# Patient Record
Sex: FEMALE | Race: WHITE
[De-identification: names, ages, dates, MRNs, and addresses within clinical notes are randomized per-mention and may not be internally consistent; named-entity substitution may affect disease eponyms.]

---

## 2017-05-29 ENCOUNTER — HOSPITAL ENCOUNTER (EMERGENCY)
Dept: HOSPITAL 39 - ER | Age: 42
Discharge: ANOTHER HOSPITAL | End: 2017-05-29
Payer: COMMERCIAL

## 2017-05-29 VITALS — OXYGEN SATURATION: 97 %

## 2017-05-29 VITALS — SYSTOLIC BLOOD PRESSURE: 97 MMHG | TEMPERATURE: 97.7 F | DIASTOLIC BLOOD PRESSURE: 68 MMHG

## 2017-05-29 DIAGNOSIS — R07.89: Primary | ICD-10-CM

## 2017-05-29 DIAGNOSIS — I10: ICD-10-CM

## 2017-05-29 DIAGNOSIS — Z79.899: ICD-10-CM

## 2017-05-29 DIAGNOSIS — G43.909: ICD-10-CM

## 2017-05-29 DIAGNOSIS — I95.0: ICD-10-CM

## 2017-05-29 PROCEDURE — 36415 COLL VENOUS BLD VENIPUNCTURE: CPT

## 2017-05-29 PROCEDURE — 70450 CT HEAD/BRAIN W/O DYE: CPT

## 2017-05-29 PROCEDURE — 94760 N-INVAS EAR/PLS OXIMETRY 1: CPT

## 2017-05-29 PROCEDURE — 80048 BASIC METABOLIC PNL TOTAL CA: CPT

## 2017-05-29 PROCEDURE — 85025 COMPLETE CBC W/AUTO DIFF WBC: CPT

## 2017-05-29 PROCEDURE — 85379 FIBRIN DEGRADATION QUANT: CPT

## 2017-05-29 PROCEDURE — 85610 PROTHROMBIN TIME: CPT

## 2017-05-29 PROCEDURE — 71010: CPT

## 2017-05-29 PROCEDURE — 85730 THROMBOPLASTIN TIME PARTIAL: CPT

## 2017-05-29 PROCEDURE — 83605 ASSAY OF LACTIC ACID: CPT

## 2017-05-29 PROCEDURE — 81001 URINALYSIS AUTO W/SCOPE: CPT

## 2017-05-29 PROCEDURE — 83880 ASSAY OF NATRIURETIC PEPTIDE: CPT

## 2017-05-29 PROCEDURE — 82550 ASSAY OF CK (CPK): CPT

## 2017-05-29 PROCEDURE — 93005 ELECTROCARDIOGRAM TRACING: CPT

## 2017-05-29 PROCEDURE — 82553 CREATINE MB FRACTION: CPT

## 2017-05-29 PROCEDURE — 84484 ASSAY OF TROPONIN QUANT: CPT

## 2017-05-29 PROCEDURE — 71275 CT ANGIOGRAPHY CHEST: CPT

## 2017-05-29 PROCEDURE — 84443 ASSAY THYROID STIM HORMONE: CPT

## 2017-05-29 PROCEDURE — 80076 HEPATIC FUNCTION PANEL: CPT

## 2017-05-29 PROCEDURE — 80307 DRUG TEST PRSMV CHEM ANLYZR: CPT

## 2017-05-29 RX ADMIN — Medication PRN ML: at 19:04

## 2017-05-29 RX ADMIN — Medication PRN ML: at 20:12

## 2017-05-29 NOTE — CT
PROCEDURE:  Head



CLINICAL HISTORY:  41 years  Female  headache



COMPARISON:  12/18/2012



TECHNIQUE:  Contiguous axial images obtained through the brain

without IV contrast.  



This exam was performed according to our department optimization

program which includes automated exposure control, adjustment of

the mA and/or kv according to patient size and/or use of

iterative reconstruction technique.



FINDINGS:



The ventricles and sulci are within normal limits for the

patient's age.

No midline shift or mass effect.

No masses identified.

No acute intracranial hemorrhage. 



No fluid or significant mucosal thickening in the visualized

paranasal sinuses.

No depressed calvarial fractures.



IMPRESSION:

  

No acute intracranial abnormality is identified.



Electronically signed by:  Alia Mcnamara  5/29/2017 8:13 PM CDT

Workstation: [id number]

## 2017-05-29 NOTE — CT
PROCEDURE:  CTA Chest



CLINICAL HISTORY:  41 years  Female  pain



COMPARISON:  None.



TECHNIQUE: Contiguous axial images obtained through the chest

during the infusion of IV contrast. Reformatted images obtained. 

MIP reformatted images obtained.   



This exam was performed according to our department optimization

program which includes automated exposure control, adjustment of

the mA and/or kv according to patient size and/or use of

iterative reconstruction technique.



FINDINGS:



The aorta is normal in caliber without dissection or rupture.

No evidence of pulmonary embolus.

No mediastinal or hilar adenopathy.

No acute infiltrate. No pericardial or pleural effusion. Upper

abdominal structures appear unremarkable.



IMPRESSION:No evidence of pulmonary embolus







Electronically signed by:  Alia Mcnamara  5/29/2017 9:49 PM CDT

Workstation: [id number]

## 2017-05-29 NOTE — RAD
EXAM DESCRIPTION:  Chest,1 View



CLINICAL HISTORY:  41 years  Female  pain



COMPARISON:  None.



FINDINGS: 



The cardiomediastinal silhouette appears unremarkable.

No consolidating infiltrates or pleural effusions.

No pneumothorax.



IMPRESSION: 



No acute abnormality is identified.



Electronically signed by:  Alia Mcnamara  5/29/2017 6:32 PM CDT

Workstation: [id number]

## 2017-05-29 NOTE — ED.PDOC
History of Present Illness





- General


Chief Complaint: Chest Pain/MI


Stated Complaint: chest pain


Time Seen by Provider: 17 18:07


Source: patient


Exam Limitations: no limitations





- History of Present Illness


Initial Comments: 





Darcy Hinojosa 42 y/o female stated that while she was at home sitting on her 

chair she had onset of chest heaviness got sweaty felt dizzy,nauseated and also 

had sharp headache her symptoms been steady and decide to come to er.Has 

history of htn,removal of brain tumor s/p craniotomy. 


Timing/Duration: 4-6 hours


Severity: moderate


Location: central, other - radiation to her back


Activities at Onset: rest


Prior Chest Pain/Cardiac Workup: no prior chest pain, no prior cardiac workup


Improving Factors: nothing


Worsening Factors: nothing


Nitro Today/Relief: provided by ED


Aspirin Treatment Today: 325 mg x 1, provided by ED


Associated Symptoms: diaphoresis, headaches, nausea/vomiting


Allergies/Adverse Reactions: 


Allergies





NO KNOWN ALLERGY Allergy (Verified 17 17:59)


 








Home Medications: 


Ambulatory Orders





Fesoterodine Fumarate [Toviaz] 8 mg PO DAILY #0  10/01/13 


Cephalexin Monohydrate [Keflex Cap] 500 mg PO Q6HRS #30 cap 03/08/15 


Eletriptan Hydrobromide [Relpax] 40 mg PO DAILY PRN 03/08/15 


Gabapentin 300 mg PO BID 07/04/15 


HYDROcodone 10MG/APAP 325MG 10 bottle PO PRN 07/04/15 


traZODone HCL [Desyrel] 150 mg PO DAILY 07/04/15 











Review of Systems





- Review of Systems


Constitutional: States: no symptoms reported


EENTM: States: no symptoms reported


Cardiology: States: see HPI


Gastrointestinal/Abdominal: States: no symptoms reported


Genitourinary: States: other - oab chronic


Musculoskeletal: States: no symptoms reported


Skin: States: no symptoms reported


Neurological: States: seizure - disorder


Endocrine: States: no symptoms reported


Hematologic/Lymphatic: States: no symptoms reported





Past Medical History (General)





- Patient Medical History


Hx Seizures: Yes


Hx Stroke: No


Hx Dementia: No


Hx Asthma: No


Hx of COPD: No


Hx Cardiac Disorders: No


Hx Congestive Heart Failure: No


Hx Pacemaker: No


Hx Hypertension: Yes


Hx Thyroid Disease: No


Hx Diabetes: No


Hx Gastroesophageal Reflux: No


Hx Renal Disease: No


Hx Cancer: No


Hx of HIV: No


Hx Hepatitis C: No


Hx MRSA: No


Hx Other PMH: Yes - migraine headaches


Surgical History: Hysterectomy, other - craniotomy ,hysterectomy, 

carpal tunnel,c-spine





- Vaccination History


Hx Tetanus, Diphtheria Vaccination: No


Hx Influenza Vaccination: Yes


Hx Pneumococcal Vaccination: Yes





- Social History


Hx Tobacco Use: No


Hx Chewing Tobacco Use: No


Hx Alcohol Use: No


Hx Substance Use: No


Hx Substance Use Treatment: No


Hx Depression: No


Hx Physical Abuse: No


Hx Emotional Abuse: No


Hx Suspected Abuse: No





- Activities of Daily Living


Patient Lives Alone: No - family


Hospice Agency (if applicable):: None


Grooming Ability: Independent


Eating (Feeding) Ability: Independent


Toileting Ability: Independent





- Female History


Patient is a Female of Child Bearing Age (10 -59 yrs old): No


Patient Pregnant: No





Family Medical History





- Family History


  ** Mother


Family History: Unknown


Living Status: Unknown


Hx Family Hypertension: Yes - parents


Hx Family Stroke: Yes - brain aneurysm-dad,sister  from it


Hx Cardiac Disease: Yes - parents





Physical Exam





- Physical Exam


General Appearance: Alert, Anxious, No apparent distress


Eyes, Ears, Nose, Throat Exam: PERRL/EOMI, normal ENT inspection, TMs normal


Neck: non-tender, full range of motion, supple, normal inspection


Respiratory: chest non-tender, lungs clear, normal breath sounds, no 

respiratory distress


Cardiovascular/Chest: normal peripheral pulses, regular rate, rhythm, no edema, 

no murmur


Peripheral Pulses: radial,right: 2+, radial,left: 2+


Gastrointestinal/Abdominal: normal bowel sounds, non tender, soft, no 

organomegaly


Extremity: normal range of motion, non-tender, normal inspection


Neurologic: no motor/sensory deficits, alert, normal mood/affect, oriented x 3


Skin Exam: normal color, warm/dry


Lymphatic: no adenopathy





Progress





- Results/Orders


Results/Orders: 





 Vital Signs - 8 hr











  17





  17:45 18:00 18:48


 


Temperature 98.4 F  


 


Pulse Rate [ 121 H 115 H 





pulse ox]   


 


Respiratory 20  





Rate   


 


Blood Pressure 154/81  





[Right Arm]   


 


O2 Sat by Pulse 96  96





Oximetry   








 





17 18:07


IV Care:Saline Lock per Protoc QSHIFT 


Telemetry .ONCE 


Sodium Chloride 0.9% (Flush) [Saline Flush Syringe]   10 ml IV PRN PRN 


EKG Stat 


Pulse Ox Stat 





17 18:20


THYROID STIMULATING HORMONE Stat 








 Laboratory Results











WBC  14.5 K/mm3 (4.8-10.8)  H  17  18:20    


 


RBC  4.45 M/mm3 (4.20-5.40)   17  18:20    


 


Hgb  12.9 gm/dL (12.0-16.0)   17  18:20    


 


Hct  39.1 % (36.0-47.0)   17  18:20    


 


MCV  88.0 fl (81.0-99.0)   17  18:20    


 


MCH  29.1 pg (27.0-31.0)   17  18:20    


 


MCHC  33.1 g/dL (33.0-37.0)   17  18:20    


 


RDW  13.4 % (11.5-14.5)   17  18:20    


 


Plt Count  240 K/mm3 (130-400)   17  18:20    


 


MPV  7.7 fl (7.40-10.4)   17  18:20    


 


Absolute Neuts (auto)  11.70 K/uL (1.8-6.8)  H  17  18:20    


 


Absolute Lymphs (auto)  2.10 K/uL (1.0-3.4)   17  18:20    


 


Absolute Monos (auto)  0.60 K/uL (0.2-0.8)   17  18:20    


 


Absolute Eos (auto)  0.10 K/uL (0.0-0.4)   17  18:20    


 


Absolute Basos (auto)  0.10 K/uL (0.0-0.1)   17  18:20    


 


Neutrophils %  80.4 % (42.0-78.0)  H  17  18:20    


 


Lymphocytes %  14.4 % (20.0-50.0)  L  17  18:20    


 


Monocytes %  4.1 % (2.0-9.0)   17  18:20    


 


Eosinophils %  0.3 % (1.0-5.0)  L  17  18:20    


 


Basophils %  0.8 % (0.0-2.0)   17  18:20    


 


PT  10.3 SECONDS (9.4-12.5)   17  18:20    


 


INR  0.910   17  18:20    


 


PTT (SP)  32.6 SECONDS (25.1-36.5)   17  18:20    


 


D-Dimer, Quantitative  < 230 ng/mL (0-230)   17  18:20    


 


Sodium  138 mmol/L (135-145)   17  18:20    


 


Potassium  3.1 mmol/L (3.6-5.0)  L  17  18:20    


 


Chloride  107 mmol/L (101-111)   17  18:20    


 


Carbon Dioxide  24 mmol/L (21-31)   17  18:20    


 


Anion Gap  10.1  (12-18)  L  17  18:20    


 


BUN  17 mg/dL (7-18)   17  18:20    


 


Creatinine  0.95 mg/dL (0.6-1.3)   17  18:20    


 


BUN/Creatinine Ratio  17.9  (10-20)   17  18:20    


 


Random Glucose  126 mg/dL ()  H  17  18:20    


 


Serum Osmolality  278.8 mOsm/L (275-295)   17  18:20    


 


Calcium  9.9 mg/dL (8.4-10.2)   17  18:20    


 


Magnesium  1.8 mg/dL (1.8-2.5)   17  18:20    


 


Total Bilirubin  0.2 mg/dL (0.2-1.0)   17  18:20    


 


Direct Bilirubin  < 0.1 mg/dL (0-0.2)   17  18:20    


 


Indirect Bilirubin  0.1 mg/dL (0.2-0.8)  L  17  18:20    


 


AST  19 IU/L (10-42)   17  18:20    


 


ALT  23 IU/L (10-60)   17  18:20    


 


Alkaline Phosphatase  79 IU/L ()   17  18:20    


 


Creatine Kinase  43 IU/L ()   17  18:20    


 


CK-MB (CK-2)  1.2 ng/mL (0.0-4.4)   17  18:20    


 


CK-MB (CK-2) %  Not Reportable   17  18:20    


 


Troponin I  < 0.02 ng/mL (0.01-0.05)   17  18:20    


 


B-Natriuretic Peptide  10.0 pg/ml (0-100)   17  18:20    


 


Serum Total Protein  7.0 gm/dL (6.4-8.2)   17  18:20    


 


Albumin  3.8 g/dl (3.2-5.5)   17  18:20    


 


Urine Color  Yellow  (Yellow)   17  18:35    


 


Urine Appearance  Clear  (Clear)   17  18:35    


 


Urine pH  7.0  (4.5-7.8)   17  18:35    


 


Ur Specific Gravity  1.020  (1.005-1.030)   17  18:35    


 


Urine Protein  Negative mg/dL  17  18:35    


 


Urine Glucose (UA)  Negative mg/dL (Negative)   17  18:35    


 


Urine Ketones  Negative mg/dL (NEGATIVE)   17  18:35    


 


Urine Blood  Negative  (Negative)   17  18:35    


 


Urine Nitrite  Negative   17  18:35    


 


Urine Bilirubin  Negative  (NEGATIVE)   17  18:35    


 


Urine Urobilinogen  0.2 mg/dL (0.2-1.0)   17  18:35    


 


Ur Leukocyte Esterase  Negative  (Negative)   17  18:35    


 


Urine RBC  0 /hpf  17  18:35    


 


Urine WBC  0 /hpf  17  18:35    


 


Ur Epithelial Cells  0 /hpf  17  18:35    


 


Urine Bacteria  0   17  18:35    


 


Urine Opiates Screen  Negative ng/mL (2000)   17  18:35    


 


Urine Barbiturates  Negative ng/mL (200)   17  18:35    


 


Ur Phencyclidine Scrn  Negative ng/mL (25)   17  18:35    


 


U Amphetamin/Meth Scrn  Negative ng/mL (1000)   17  18:35    


 


U Benzodiazepines Scrn  Negative ng/mL (200)   17  18:35    


 


U Cocaine Metab Screen  Negative ng/mL (300)   17  18:35    


 


U Cannabinoids Screen  Negative ng/mL (50)   17  18:35    








 Vital Signs - 8 hr











  17





  17:45 18:00 18:48


 


Temperature 98.4 F  


 


Pulse Rate   


 


Pulse Rate [ 121 H 115 H 





pulse ox]   


 


Respiratory 20  





Rate   


 


Blood Pressure 154/81  





[Right Arm]   


 


O2 Sat by Pulse 96  96





Oximetry   














  17





  19:16 20:13 20:30


 


Temperature 98.8 F  


 


Pulse Rate 104 H 94 H 


 


Pulse Rate [ 104 H 94 H 





pulse ox]   


 


Respiratory 16 16 





Rate   


 


Blood Pressure 109/52 94/62 110/64





[Right Arm]   


 


O2 Sat by Pulse 98 98 





Oximetry   








 Vital Signs - 8 hr











  17





  17:45 18:00 18:48


 


Temperature 98.4 F  


 


Pulse Rate   


 


Pulse Rate [ 121 H 115 H 





pulse ox]   


 


Respiratory 20  





Rate   


 


Blood Pressure 154/81  





[Right Arm]   


 


O2 Sat by Pulse 96  96





Oximetry   














  17





  19:16 20:13 20:30


 


Temperature 98.8 F  


 


Pulse Rate 104 H 94 H 


 


Pulse Rate [ 104 H 94 H 





pulse ox]   


 


Respiratory 16 16 





Rate   


 


Blood Pressure 109/52 94/62 110/64





[Right Arm]   


 


O2 Sat by Pulse 98 98 





Oximetry   














  17





  21:06 21:36 22:14


 


Temperature   


 


Pulse Rate 83  


 


Pulse Rate [ 83 86 87





pulse ox]   


 


Respiratory 18 16 12





Rate   


 


Blood Pressure 109/64 122/75 72/47





[Right Arm]   


 


O2 Sat by Pulse 99  97





Oximetry   














  17





  22:32


 


Temperature 


 


Pulse Rate 


 


Pulse Rate [ 83





pulse ox] 


 


Respiratory 11 L





Rate 


 


Blood Pressure 108/51





[Right Arm] 


 


O2 Sat by Pulse 





Oximetry 








 





17 18:07


IV Care:Saline Lock per Protoc QSHIFT 


Telemetry .ONCE 


Sodium Chloride 0.9% (Flush) [Saline Flush Syringe]   10 ml IV PRN PRN 


EKG Stat 


Pulse Ox Stat 





17 20:00


EKG STAT 





17 20:59


Hold Metformin x 48Hrs [id number] 





17 22:13


Sodium Chloride 0.9% 1000ML [Ns 1000 ml] 1,000 ml IVS ONCE 








 Laboratory Results











WBC  14.1 K/mm3 (4.8-10.8)  H  17  21:08    


 


RBC  4.06 M/mm3 (4.20-5.40)  L  17  21:08    


 


Hgb  11.8 gm/dL (12.0-16.0)  L  17  21:08    


 


Hct  36.1 % (36.0-47.0)   17  21:08    


 


MCV  88.9 fl (81.0-99.0)   17  21:08    


 


MCH  29.0 pg (27.0-31.0)   17  21:08    


 


MCHC  32.7 g/dL (33.0-37.0)  L  17  21:08    


 


RDW  13.6 % (11.5-14.5)   17  21:08    


 


Plt Count  225 K/mm3 (130-400)   17  21:08    


 


MPV  7.6 fl (7.40-10.4)   17  21:08    


 


Absolute Neuts (auto)  11.00 K/uL (1.8-6.8)  H  17  21:08    


 


Absolute Lymphs (auto)  2.30 K/uL (1.0-3.4)   17  21:08    


 


Absolute Monos (auto)  0.70 K/uL (0.2-0.8)   17  21:08    


 


Absolute Eos (auto)  0.10 K/uL (0.0-0.4)   17  21:08    


 


Absolute Basos (auto)  0.10 K/uL (0.0-0.1)   17  21:08    


 


Neutrophils %  78.0 % (42.0-78.0)   17  21:08    


 


Lymphocytes %  16.0 % (20.0-50.0)  L  17  21:08    


 


Monocytes %  5.0 % (2.0-9.0)   17  21:08    


 


Eosinophils %  0.4 % (1.0-5.0)  L  17  21:08    


 


Basophils %  0.6 % (0.0-2.0)   17  21:08    


 


PT  10.3 SECONDS (9.4-12.5)   17  18:20    


 


INR  0.910   17  18:20    


 


PTT (SP)  32.6 SECONDS (25.1-36.5)   17  18:20    


 


D-Dimer, Quantitative  < 230 ng/mL (0-230)   17  18:20    


 


Sodium  138 mmol/L (135-145)   17  18:20    


 


Potassium  3.1 mmol/L (3.6-5.0)  L  17  18:20    


 


Chloride  107 mmol/L (101-111)   17  18:20    


 


Carbon Dioxide  24 mmol/L (21-31)   17  18:20    


 


Anion Gap  10.1  (12-18)  L  17  18:20    


 


BUN  17 mg/dL (7-18)   17  18:20    


 


Creatinine  0.95 mg/dL (0.6-1.3)   17  18:20    


 


BUN/Creatinine Ratio  17.9  (10-20)   17  18:20    


 


Random Glucose  126 mg/dL ()  H  17  18:20    


 


Serum Osmolality  278.8 mOsm/L (275-295)   17  18:20    


 


Lactic Acid  0.4 mmol/L (0.5-2.2)  L  17  20:14    


 


Calcium  9.9 mg/dL (8.4-10.2)   17  18:20    


 


Magnesium  1.8 mg/dL (1.8-2.5)   17  18:20    


 


Total Bilirubin  0.2 mg/dL (0.2-1.0)   17  18:20    


 


Direct Bilirubin  < 0.1 mg/dL (0-0.2)   17  18:20    


 


Indirect Bilirubin  0.1 mg/dL (0.2-0.8)  L  17  18:20    


 


AST  19 IU/L (10-42)   17  18:20    


 


ALT  23 IU/L (10-60)   17  18:20    


 


Alkaline Phosphatase  79 IU/L ()   17  18:20    


 


Creatine Kinase  43 IU/L ()   17  18:20    


 


CK-MB (CK-2)  1.2 ng/mL (0.0-4.4)   17  18:20    


 


CK-MB (CK-2) %  Not Reportable   17  18:20    


 


Troponin I  < 0.02 ng/mL (0.01-0.05)   17  20:14    


 


B-Natriuretic Peptide  10.0 pg/ml (0-100)   17  18:20    


 


Serum Total Protein  7.0 gm/dL (6.4-8.2)   17  18:20    


 


Albumin  3.8 g/dl (3.2-5.5)   17  18:20    


 


TSH  1.74 uIU/mL (0.34-5.60)   17  18:20    


 


Urine Color  Yellow  (Yellow)   17  18:35    


 


Urine Appearance  Clear  (Clear)   17  18:35    


 


Urine pH  7.0  (4.5-7.8)   17  18:35    


 


Ur Specific Gravity  1.020  (1.005-1.030)   17  18:35    


 


Urine Protein  Negative mg/dL  17  18:35    


 


Urine Glucose (UA)  Negative mg/dL (Negative)   17  18:35    


 


Urine Ketones  Negative mg/dL (NEGATIVE)   17  18:35    


 


Urine Blood  Negative  (Negative)   17  18:35    


 


Urine Nitrite  Negative   17  18:35    


 


Urine Bilirubin  Negative  (NEGATIVE)   17  18:35    


 


Urine Urobilinogen  0.2 mg/dL (0.2-1.0)   17  18:35    


 


Ur Leukocyte Esterase  Negative  (Negative)   17  18:35    


 


Urine RBC  0 /hpf  17  18:35    


 


Urine WBC  0 /hpf  17  18:35    


 


Ur Epithelial Cells  0 /hpf  17  18:35    


 


Urine Bacteria  0   17  18:35    


 


Urine Opiates Screen  Negative ng/mL (2000)   17  18:35    


 


Urine Barbiturates  Negative ng/mL (200)   17  18:35    


 


Ur Phencyclidine Scrn  Negative ng/mL (25)   17  18:35    


 


U Amphetamin/Meth Scrn  Negative ng/mL (1000)   17  18:35    


 


U Benzodiazepines Scrn  Negative ng/mL (200)   17  18:35    


 


U Cocaine Metab Screen  Negative ng/mL (300)   17  18:35    


 


U Cannabinoids Screen  Negative ng/mL (50)   17  18:35    














- EKG/XRAY/CT


EKG: Sinus, Tachy, no ST T wave changes


Comments: heart rate-117


CT: head no contrast-no acute abnormalities noted/radiologist





- Additional EKG/XRAY/Consults


EKG #2: Sinus, no ST T wave changes


Comments: heart rate-86





Departure





- Departure


Clinical Impression: 


 Chest tightness or pressure





Migraine


Qualifiers:


 Migraine type: unspecified Status migrainosus presence: without status 

migrainosus Intractability: not intractable Qualified Code(s): G43.909 - 

Migraine, unspecified, not intractable, without status migrainosus





Hypotension


Qualifiers:


 Hypotension type: idiopathic hypotension Qualified Code(s): I95.0 - Idiopathic 

hypotension





Time of Disposition: 22:41 - D/W Dr. Wiley ulloa md [facility] for transfer


Disposition: Transfer to Hospital


Condition: Fair


Departure Forms:  Patient Portal Self Enrollment


Referrals: 


NEO BERGMAN [Primary Care Provider] - 1-2 Weeks


Home Medications: 


Ambulatory Orders





Fesoterodine Fumarate [Toviaz] 8 mg PO DAILY #0  10/01/13 


Cephalexin Monohydrate [Keflex Cap] 500 mg PO Q6HRS #30 cap 03/08/15 


Eletriptan Hydrobromide [Relpax] 40 mg PO DAILY PRN 03/08/15 


Gabapentin 300 mg PO BID 07/04/15 


HYDROcodone 10MG/APAP 325MG 10 bottle PO PRN 07/04/15 


traZODone HCL [Desyrel] 150 mg PO DAILY 07/04/15

## 2018-09-05 ENCOUNTER — HOSPITAL ENCOUNTER (EMERGENCY)
Dept: HOSPITAL 39 - ER | Age: 43
Discharge: HOME | End: 2018-09-05
Payer: COMMERCIAL

## 2018-09-05 VITALS — SYSTOLIC BLOOD PRESSURE: 141 MMHG | DIASTOLIC BLOOD PRESSURE: 96 MMHG | TEMPERATURE: 97.9 F

## 2018-09-05 VITALS — OXYGEN SATURATION: 100 %

## 2018-09-05 DIAGNOSIS — Z79.899: ICD-10-CM

## 2018-09-05 DIAGNOSIS — J45.909: ICD-10-CM

## 2018-09-05 DIAGNOSIS — G43.909: Primary | ICD-10-CM

## 2018-09-05 DIAGNOSIS — I10: ICD-10-CM

## 2018-09-05 DIAGNOSIS — R56.9: ICD-10-CM

## 2018-09-05 PROCEDURE — 36415 COLL VENOUS BLD VENIPUNCTURE: CPT

## 2018-09-05 PROCEDURE — 81001 URINALYSIS AUTO W/SCOPE: CPT

## 2018-09-05 PROCEDURE — 80048 BASIC METABOLIC PNL TOTAL CA: CPT

## 2018-09-05 PROCEDURE — 85025 COMPLETE CBC W/AUTO DIFF WBC: CPT

## 2018-09-05 NOTE — ED.PDOC
History of Present Illness





- General


Source: patient


Exam Limitations: no limitations





- History of Present Illness


Initial Comments: 





PT REPORTS 3 DAY HISTORY OF 9/10 MIGRAINE HEADACHE ASSOCIATED WITH NAUSEA, 

VOMITING, AND PHOTOPHOBIA.  PT REPORTS THAT SHE HAS HAD INCREASED SEIZURE 

ACTIVITY ACCORDING TO HER .  PT STATES THAT TODAYS MIGRAINE IS TYPICAL 

OF MIGRAINES IN THE PAST.  SHE REPORTS NO RELIEF FROM RELPAX AT HOME.  


Quality: severe


Head Injury Location: frontal


Recent Head Trauma: chronic headaches


Improving Factors: rest, other - DARK QUIET ROOM


Worsening Factors: other - BRIGHT LIGHT/LOUD NOISES


Associated Symptoms: nausea/vomiting, seizures





<Toyin Franklin H - Last Filed: 18 17:39>





<Sebastián Muro - Last Filed: 18 20:44>





- General


Chief Complaint: Headache


Stated Complaint: MIGRAINE, N/V


Time Seen by Provider: 18 17:34





- History of Present Illness


Allergies/Adverse Reactions: 


Allergies





NO KNOWN ALLERGY Allergy (Verified 17 17:59)


 








Home Medications: 


Ambulatory Orders





Fesoterodine Fumarate [Toviaz] 8 mg PO DAILY #0 10/01/13 


Cephalexin Monohydrate [Keflex Cap] 500 mg PO Q6HRS #30 cap 03/08/15 


Eletriptan Hydrobromide [Relpax] 40 mg PO DAILY PRN 03/08/15 


Gabapentin 300 mg PO BID 07/04/15 


HYDROcodone 10MG/APAP 325MG 10 bottle PO PRN 07/04/15 


traZODone HCL [Desyrel] 150 mg PO DAILY 07/04/15 











Review of Systems





- Review of Systems


Constitutional: Denies: chills, fever


EENTM: Denies: blurred vision, double vision


Respiratory: Denies: cough, short of breath


Cardiology: Denies: chest pain, palpitations


Gastrointestinal/Abdominal: States: see HPI, nausea, vomiting.  Denies: 

abdominal pain


Genitourinary: Denies: dysuria, frequency


Musculoskeletal: Denies: joint pain, joint swelling


Skin: Denies: dryness, lesions


Neurological: States: see HPI, headache, seizure.  Denies: numbness


Endocrine: States: no symptoms reported





<Toyin Franklin H - Last Filed: 18 17:39>





Past Medical History (General)





- Patient Medical History


Hx Seizures: Yes


Hx Stroke: No


Hx Dementia: No


Hx Asthma: Yes


Hx of COPD: No


Hx Cardiac Disorders: No


Hx Congestive Heart Failure: No


Hx Pacemaker: No


Hx Hypertension: Yes


Hx Thyroid Disease: No


Hx Diabetes: No


Hx Gastroesophageal Reflux: No


Hx Renal Disease: No


Hx Cancer: No


Hx of HIV: No


Hx Hepatitis C: No


Hx MRSA: No





- Vaccination History


Hx Tetanus, Diphtheria Vaccination: Yes


Hx Influenza Vaccination: Yes


Hx Pneumococcal Vaccination: Yes


Immunizations Up to Date: No





- Social History


Hx Tobacco Use: No


Hx Chewing Tobacco Use: No


Hx Alcohol Use: No


Hx Substance Use: No


Hx Substance Use Treatment: No


Hx Depression: No


Feels Threatened In Home Enviroment: No


Feels Threatened In a Relationship: No


Hx Physical Abuse: No


Hx Emotional Abuse: No


Hx Suspected Abuse: No





- Female History


Patient is a Female of Child Bearing Age (10 -59 yrs old): No


Patient Pregnant: No





<Toyin Franklin - Last Filed: 18 17:39>





Family Medical History





- Family History


  ** Mother


Family History: Unknown


Living Status: Unknown


Hx Family Hypertension: Yes - parents


Hx Family Stroke: Yes - brain aneurysm-dad,sister  from it


Hx Cardiac Disease: Yes - parents





<Toyin Franklin - Last Filed: 18 17:39>





Physical Exam





- Physical Exam


General Appearance: Alert, Obvious distress, Obese, Well Groomed


Eyes, Ears, Nose, Throat Exam: normal ENT inspection, photophobia


Neck: normal inspection, other - POSTERIOR NECK TENDERNESS


Cardiovascular/Chest: regular rate, rhythm, no murmur


Respiratory: normal breath sounds, no respiratory distress


Gastrointestinal/Abdominal: non tender, soft


Back Exam: normal inspection, no CVA tenderness


Extremity: non-tender, normal inspection


Mental Status: alert, oriented x 3


CNs Exam: normal hearing, normal speech


Coordination/Gait: normal gait


Motor/Sensory: no motor deficit, no sensory deficit





<RigobertoToyin - Last Filed: 18 17:39>





Progress





- EKG/XRAY/CT


CT Ordered: No


CT Interpretation Call Back: No





<RigobertoKwesianh JF - Last Filed: 18 17:39>





- Progress


Progress: 





18 20:40


The patient is a 42-year-old female presented to emergency room with what she 

reports is her typical migraine that has lasted longer than it normally does.  

Her Relpax has filled her for 3 days.  She has apparently had previous workups 

for her migraines.  Every now and she gets 1 that requires additional 

treatment.  No new symptoms with this one.  Laboratory work is reassuring.  the 

patient has responded well to IV fluids, antiemetics and a dose of pain 

medications.  She will be allowed to go home to get some sleep.  She does need 

follow-up with her primary care doctor around Friday.  ER warnings were 

given.she has exhibited no focal neurological changes and there is no clinical 

evidence of any meningitis or altered mental status.


18 20:43








- Results/Orders


Results/Orders: 





 Laboratory Tests











  18





  17:45 17:45 18:15


 


WBC  7.9  


 


RBC  4.39  


 


Hgb  13.2  


 


Hct  40.4  


 


MCV  91.9  


 


MCH  30.0  


 


MCHC  32.7 L  


 


RDW  12.9  


 


Plt Count  206  


 


MPV  8.5  


 


Absolute Neuts (auto)  5.10  


 


Absolute Lymphs (auto)  1.80  


 


Absolute Monos (auto)  0.60  


 


Absolute Eos (auto)  0.30  


 


Absolute Basos (auto)  0.00  


 


Neutrophils %  64.8  


 


Lymphocytes %  23.0  


 


Monocytes %  7.8  


 


Eosinophils %  3.9  


 


Basophils %  0.5  


 


Sodium   142 


 


Potassium   3.7 


 


Chloride   112 H 


 


Carbon Dioxide   25 


 


Anion Gap   8.7 L 


 


BUN   12 


 


Creatinine   0.88 


 


BUN/Creatinine Ratio   13.6 


 


Random Glucose   73 


 


Serum Osmolality   281.5 


 


Calcium   10.3 H 


 


Urine Color    Yellow


 


Urine Appearance    Sl cloudy


 


Urine pH    6.0


 


Ur Specific Gravity    1.010


 


Urine Protein    Negative


 


Urine Glucose (UA)    Negative


 


Urine Ketones    Negative


 


Urine Blood    Negative


 


Urine Nitrite    Negative


 


Urine Bilirubin    Negative


 


Urine Urobilinogen    0.2


 


Ur Leukocyte Esterase    Negative


 


Urine RBC    0


 


Urine WBC    0-1


 


Ur Epithelial Cells    3-5


 


Amorphous Sediment    1+


 


Urine Bacteria    1+














<Sebastián Muro L - Last Filed: 18 20:44>





Departure





<Toyin Franklin - Last Filed: 18 17:39>





- Departure


Diet: regular diet


Activity: increase activity as tolerated





<Sebastián Muro - Last Filed: 18 20:44>





- Departure


Clinical Impression: 


Migraine


Qualifiers:


 Migraine type: unspecified Status migrainosus presence: without status 

migrainosus Intractability: not intractable Qualified Code(s): G43.909 - 

Migraine, unspecified, not intractable, without status migrainosus





Disposition: Discharge to Home or Self Care


Condition: Fair


Departure Forms:  ED Discharge - Pt. Copy, Patient Portal Self Enrollment


Instructions:  Migraine Headaches in Adults


Referrals: 


NEO BERGMAN [Primary Care Provider] - 1-2 Days


Home Medications: 


Ambulatory Orders





Fesoterodine Fumarate [Toviaz] 8 mg PO DAILY #0 10/01/13 


Cephalexin Monohydrate [Keflex Cap] 500 mg PO Q6HRS #30 cap 03/08/15 


Eletriptan Hydrobromide [Relpax] 40 mg PO DAILY PRN 03/08/15 


Gabapentin 300 mg PO BID 07/04/15 


HYDROcodone 10MG/APAP 325MG 10 bottle PO PRN 07/04/15 


traZODone HCL [Desyrel] 150 mg PO DAILY 07/04/15 








Additional Instructions: 


Patient presented with migraine lasting for almost 3 days.  She has received IV 

fluids, an anti-inflammatory, a muscle relaxer, an antiemetic and a pain 

medication.  She is feeling somewhat better.  The patient will be allowed to go 

home.  She needs to follow up with her primary care doctor towards the end of 

the week.  ER warnings were given.

## 2019-03-26 ENCOUNTER — HOSPITAL ENCOUNTER (OUTPATIENT)
Dept: HOSPITAL 39 - MAMMO | Age: 44
End: 2019-03-26
Attending: FAMILY MEDICINE
Payer: COMMERCIAL

## 2019-03-26 DIAGNOSIS — Z12.31: Primary | ICD-10-CM

## 2019-03-27 NOTE — MAM
EXAM DESCRIPTION: 

3D Screening BILATERAL : Digital Mammography.



CLINICAL HISTORY: 

43 years Female ANNUAL SCREENING . Occasional breast tenderness.

No personal history of breast cancer. Sister with breast and

ovarian cancer. Mother with ovarian cancer. Remote family history

of breast and ovarian cancer. Childbirth. Postmenopausal 10

years. No HRT. Left breast larger than right..  Lifetime risk of

developing breast cancer (Tyrer-Cuzick model)(%):  9.0.



COMPARISON: 

Baseline study at this facility..  No prior reports available.  



TECHNIQUE: 

Bilateral CC and MLO projection full-field images, digital

tomosynthesis mammographic technique. Bilateral digital 2-D

full-field MLO images. CAD not available for tomosynthesis or 2-D

images.  



FINDINGS: 

The breast parenchymal density pattern is: Almost entirely fatty.

No skin thickening or nipple retraction.

No new focal, stellate mass or density, focal asymmetry , and no

suspicious microcalcifications bilaterally.



IMPRESSION: 

BI-RADS CATEGORY: 1 - NEGATIVE

FOLLOW UP: Routine digital bilateral screening, one year interval

from date



Written communication explaining the findings and follow-up, will

be mailed to the patient and referring health care provider.



According to the [employer] College [employer], yearly mammograms

are recommended starting at age 40 and continuing as long as a

woman is in good health.  Any breast change noted on a breast

self-exam should be reported promptly to the patient's healthcare

provider.  Breast MRI is recommended for women with an

approximately 20-25% or greater lifetime risk of breast cancer,

including women with a strong family history of breast or ovarian

cancer and women who have been treated for Hodgkin's disease.



A negative mammographic report should not delay tissue diagnosis

in patients with significant clinical history or physical

findings.



Extremely dense breast tissue limits the sensitivity of digital

mammography.



Electronically signed by:  Neil Ortega MD  3/27/2019 2:37 PM CDT

Workstation: [id number]

## 2019-08-06 ENCOUNTER — HOSPITAL ENCOUNTER (OUTPATIENT)
Dept: HOSPITAL 39 - ER | Age: 44
Setting detail: OBSERVATION
LOS: 1 days | Discharge: HOME | End: 2019-08-07
Attending: NURSE PRACTITIONER | Admitting: NURSE PRACTITIONER
Payer: COMMERCIAL

## 2019-08-06 DIAGNOSIS — G89.4: ICD-10-CM

## 2019-08-06 DIAGNOSIS — F41.9: ICD-10-CM

## 2019-08-06 DIAGNOSIS — G47.33: ICD-10-CM

## 2019-08-06 DIAGNOSIS — K76.0: ICD-10-CM

## 2019-08-06 DIAGNOSIS — Z79.891: ICD-10-CM

## 2019-08-06 DIAGNOSIS — Z99.81: ICD-10-CM

## 2019-08-06 DIAGNOSIS — R60.0: ICD-10-CM

## 2019-08-06 DIAGNOSIS — J45.909: ICD-10-CM

## 2019-08-06 DIAGNOSIS — G40.909: ICD-10-CM

## 2019-08-06 DIAGNOSIS — I10: ICD-10-CM

## 2019-08-06 DIAGNOSIS — R07.89: Primary | ICD-10-CM

## 2019-08-06 DIAGNOSIS — Z79.899: ICD-10-CM

## 2019-08-06 DIAGNOSIS — K80.20: ICD-10-CM

## 2019-08-06 DIAGNOSIS — E86.0: ICD-10-CM

## 2019-08-06 DIAGNOSIS — Z90.710: ICD-10-CM

## 2019-08-06 DIAGNOSIS — F32.9: ICD-10-CM

## 2019-08-06 PROCEDURE — 82550 ASSAY OF CK (CPK): CPT

## 2019-08-06 PROCEDURE — 96375 TX/PRO/DX INJ NEW DRUG ADDON: CPT

## 2019-08-06 PROCEDURE — 85610 PROTHROMBIN TIME: CPT

## 2019-08-06 PROCEDURE — 76705 ECHO EXAM OF ABDOMEN: CPT

## 2019-08-06 PROCEDURE — 71045 X-RAY EXAM CHEST 1 VIEW: CPT

## 2019-08-06 PROCEDURE — 99285 EMERGENCY DEPT VISIT HI MDM: CPT

## 2019-08-06 PROCEDURE — 82553 CREATINE MB FRACTION: CPT

## 2019-08-06 PROCEDURE — 85730 THROMBOPLASTIN TIME PARTIAL: CPT

## 2019-08-06 PROCEDURE — 85025 COMPLETE CBC W/AUTO DIFF WBC: CPT

## 2019-08-06 PROCEDURE — 85379 FIBRIN DEGRADATION QUANT: CPT

## 2019-08-06 PROCEDURE — 83880 ASSAY OF NATRIURETIC PEPTIDE: CPT

## 2019-08-06 PROCEDURE — 71275 CT ANGIOGRAPHY CHEST: CPT

## 2019-08-06 PROCEDURE — 94760 N-INVAS EAR/PLS OXIMETRY 1: CPT

## 2019-08-06 PROCEDURE — 96374 THER/PROPH/DIAG INJ IV PUSH: CPT

## 2019-08-06 PROCEDURE — 84484 ASSAY OF TROPONIN QUANT: CPT

## 2019-08-06 PROCEDURE — 80061 LIPID PANEL: CPT

## 2019-08-06 PROCEDURE — 36415 COLL VENOUS BLD VENIPUNCTURE: CPT

## 2019-08-06 PROCEDURE — 80076 HEPATIC FUNCTION PANEL: CPT

## 2019-08-06 PROCEDURE — 93005 ELECTROCARDIOGRAM TRACING: CPT

## 2019-08-06 PROCEDURE — 80048 BASIC METABOLIC PNL TOTAL CA: CPT

## 2019-08-06 PROCEDURE — 96372 THER/PROPH/DIAG INJ SC/IM: CPT

## 2019-08-06 RX ADMIN — METHOCARBAMOL PRN MG: 750 TABLET ORAL at 21:22

## 2019-08-06 RX ADMIN — TOPIRAMATE SCH MG: 25 TABLET, FILM COATED ORAL at 20:49

## 2019-08-06 RX ADMIN — GABAPENTIN SCH MG: 300 CAPSULE ORAL at 20:49

## 2019-08-06 RX ADMIN — HYDROCODONE BITARTRATE AND ACETAMINOPHEN PRN EA: 10; 325 TABLET ORAL at 18:42

## 2019-08-06 RX ADMIN — Medication SCH ML: at 20:50

## 2019-08-06 NOTE — CT
EXAM DESCRIPTION: 



CTA Chest



CLINICAL HISTORY: 



chest pain



COMPARISON: 



May 29, 2017.



TECHNIQUE: 



Chest CTA was performed with IV contrast including MIP and/or

Three-D reconstructed images.  This exam was performed according

to our departmental dose-optimization program, which includes

automated exposure control, adjustment of the mA and/or kV

according to patient size and/or use of iterative reconstruction

technique.



FINDINGS:



No pulmonary embolus. No thoracic aortic aneurysm or dissection.

Visualized portions of the thyroid and thoracic inlet are

unremarkable. No mediastinal or hilar adenopathy. No pleural or

pericardial effusion. No esophageal wall thickening. The central

airways are clear. No airspace consolidation or lung mass. No

pneumothorax or fracture. The gallbladder is slightly distended,

measuring 4.3 cm transverse diameter without pericholecystic

inflammation or calcified gallstone. Visualized portions of the

upper abdomen are otherwise unremarkable. No fracture or

pneumothorax. Postoperative changes in the cervical spine.





IMPRESSION: 



No pulmonary embolus or other acute intrathoracic abnormality.



Slightly dilated gallbladder without pericholecystic inflammation

calcified gallstone. If clinically suspicious of cholelithiasis,

right upper quadrant ultrasound is suggested.



Electronically signed by:  Matt Joseph MD  8/6/2019 11:37 AM CDT

Workstation: [id number]

## 2019-08-06 NOTE — ED.PDOC
History of Present Illness





- General


Chief Complaint: Chest Pain/MI


Stated Complaint: chest pain


Time Seen by Provider: 19 10:08


Source: patient


Exam Limitations: no limitations





- History of Present Illness


Initial Comments: 





PT PRESENTS WITH COMPLAINT OF CHEST PAIN THAT BEGAN LAST NIGHT AROUND MIDNIGHT. 

PT DESCRIBES THAT PAIN AS A MIDSTERNAL PRESSURE THAT RADIATES TO THE LUE.  PT 

ALSO REPORTS SOB AND BILATERAL LEG SWELLING AND PAIN.  PT STATES THAT SHE RODE A

BUS FROM [place] FOR 3 DAYS RECENTLY.  PT DENIES HX OF CAD BUT DOES REPORT 

HISTORY OF CVA AND HTN. 


Timing/Duration: 7-24 hours


Severity/Quality: moderate, dull, pressure


Location: substernal


Chest Pain Radiation: arms


Activities at Onset: none


Prior Chest Pain/Cardiac Workup: no prior chest pain, no prior cardiac workup


Improving Factors: nothing


Worsening Factors: nothing


Nitro Today/Relief: no nitro taken today


Aspirin Treatment Today: no aspirin today


Associated Symptoms: edema, shortness of breath


Allergies/Adverse Reactions: 


Allergies





NO KNOWN ALLERGY Allergy (Verified 17 17:59)


   





Home Medications: 


Ambulatory Orders





Fesoterodine Fumarate [Toviaz] 8 mg PO DAILY #0 10/01/13 


Eletriptan Hydrobromide [Relpax] 40 mg PO DAILY PRN 03/08/15 


Gabapentin 600 mg PO TID 07/04/15 


HYDROcodone 10MG/APAP 325MG 1 each PO TID PRN 07/04/15 


Benzonatate 200 mg PO Q12H 19 


Budesonide-Formoterol Fumarate [Symbicort] 1 aer IN DAILY 19 


Clonazepam 0.5 mg PO Q6H PRN 19 


Hydroxyzine HCl 12.5 mg PO NOON 19 


Hydroxyzine HCl 25 mg PO BEDTIME 19 


Losartan Potassium & Hydrochlo [Losartan Potassium/Hydroc 100-25 mg] 1 tab PO 

DAILY 19 


Methocarbamol 500 mg PO QID PRN 19 


Multiple Vitamins W/ Minerals [Womens One Daily] 1 tab PO DAILY 19 


Topiramate [Topamax] 200 mg PO BID 19 











Review of Systems





- Review of Systems


Constitutional: Denies: chills, fever


EENTM: Denies: nose congestion, throat pain


Respiratory: States: see HPI, short of breath.  Denies: cough


Cardiology: States: see HPI, chest pain, edema


Gastrointestinal/Abdominal: Denies: diarrhea, vomiting


Genitourinary: Denies: dysuria, frequency


Musculoskeletal: Denies: joint pain, joint swelling


Skin: Denies: dryness, lesions


Neurological: Denies: headache, paresthesia


Endocrine: States: no symptoms reported


Hematologic/Lymphatic: States: no symptoms reported





Past Medical History (General)





- Patient Medical History


Hx Seizures: Yes - Epilepsy


Hx Stroke: No


Hx Dementia: No


Hx Asthma: Yes


Hx of COPD: No


Hx Cardiac Disorders: No


Hx Congestive Heart Failure: No


Hx Pacemaker: No


Hx Hypertension: Yes


Hx Thyroid Disease: No


Hx Diabetes: No


Hx Gastroesophageal Reflux: No


Hx Renal Disease: No


Hx Cancer: No


Hx of HIV: No


Hx Hepatitis C: No


Hx MRSA: No





- Vaccination History


Hx Tetanus, Diphtheria Vaccination: Yes


Hx Influenza Vaccination: Yes


Hx Pneumococcal Vaccination: Yes





- Social History


Hx Tobacco Use: No


Hx Chewing Tobacco Use: No


Hx Alcohol Use: No


Hx Substance Use: No


Hx Substance Use Treatment: No


Hx Depression: No


Hx Physical Abuse: No


Hx Emotional Abuse: No


Hx Suspected Abuse: No





- Female History


Patient Pregnant: No





Family Medical History





- Family History


  ** Mother


Family History: Unknown


Living Status: Unknown


Hx Family Hypertension: Yes - parents


Hx Family Stroke: Yes - brain aneurysm-dad,sister  from it


Hx Cardiac Disease: Yes - parents





Physical Exam





- Physical Exam


General Appearance: Alert, Obese, Well Groomed, Well Hydrated, Other - APPEARS 

UNCOMFORTABLE


Eyes, Ears, Nose, Throat Exam: normal ENT inspection


Neck: full range of motion, supple


Respiratory: lungs clear, normal breath sounds, no respiratory distress


Cardiovascular/Chest: regular rate, rhythm





Progress





- Progress


Progress: 





19 12:10


PT REPORTS SIGNIFICANT IMPROVEMENT IN CHEST PAIN AFTER 1SL NTG.  NOW RATING IT 

AT 3/10.  PTS BP DROPPED TO 82 SYSTOLIC AFTER INITIAL DOSE.  FURTHER DOSES HELD.

  BP IMPROVED  AFTER INITIATION OF IV NS BOLUS.  LABS AND DIAGNOSTICS 

DISCUSSED.  





- Results/Orders


Results/Orders: 





                                Laboratory Tests











  19





  10:15 10:15


 


WBC  6.5 


 


RBC  4.26 


 


Hgb  13.0 


 


Hct  38.9 


 


MCV  91.3 


 


MCH  30.6 


 


MCHC  33.5 


 


RDW  12.9 


 


Plt Count  211 


 


MPV  8.3 


 


Absolute Neuts (auto)  4.40 


 


Absolute Lymphs (auto)  1.50 


 


Absolute Monos (auto)  0.50 


 


Absolute Eos (auto)  0.10 


 


Absolute Basos (auto)  0.10 


 


Neutrophils %  67.1 


 


Lymphocytes %  22.6 


 


Monocytes %  7.2 


 


Eosinophils %  2.2 


 


Basophils %  0.9 


 


PT  < 8.9 L 


 


INR  < 1.00 


 


PTT (SP)  26.4 


 


D-Dimer, Quantitative   0.60 H*


 


Sodium  139 


 


Potassium  3.8 


 


Chloride  113 H 


 


Carbon Dioxide  18 L 


 


Anion Gap  11.8 L 


 


BUN  19 H 


 


Creatinine  0.93 


 


BUN/Creatinine Ratio  20.4 H 


 


Random Glucose  91 


 


Serum Osmolality  279.4 


 


Calcium  9.8 


 


Magnesium  2.1 


 


Total Bilirubin  0.5 


 


Direct Bilirubin  < 0.1 


 


Indirect Bilirubin  0.4 


 


AST  17 


 


ALT  18 


 


Alkaline Phosphatase  67 


 


Creatine Kinase  40 


 


CK-MB (CK-2)  1.3 


 


CK-MB (CK-2) %  Not Reportable 


 


Troponin I  < 0.02 


 


B-Natriuretic Peptide  9.1 


 


Serum Total Protein  6.8 


 


Albumin  3.9 














- EKG/XRAY/CT


EKG: Sinus - @88BPM, NL INTERVALS, NL AXIS, no ST T wave changes, Unchanged from

 - 17





- Additional EKG/XRAY/Consults


EKG #2: Sinus - @75bpm, NL INTERVALS, NL AXIS, no ST T wave changes, Unchanged 

from - PREVIOUS EKG





Departure





- Departure


Clinical Impression: 


 Chest pain, Lower extremity edema, Dehydration, Cholelithiasis





Time of Disposition: 12:12


Disposition: Admit Patient


Condition: Fair


Home Medications: 


Ambulatory Orders





Fesoterodine Fumarate [Toviaz] 8 mg PO DAILY #0 10/01/13 


Eletriptan Hydrobromide [Relpax] 40 mg PO DAILY PRN 03/08/15 


Gabapentin 600 mg PO TID 07/04/15 


HYDROcodone 10MG/APAP 325MG 1 each PO TID PRN 07/04/15 


Benzonatate 200 mg PO Q12H 19 


Budesonide-Formoterol Fumarate [Symbicort] 1 aer IN DAILY 19 


Clonazepam 0.5 mg PO Q6H PRN 19 


Hydroxyzine HCl 12.5 mg PO NOON 19 


Hydroxyzine HCl 25 mg PO BEDTIME 19 


Losartan Potassium & Hydrochlo [Losartan Potassium/Hydroc 100-25 mg] 1 tab PO 

DAILY 19 


Methocarbamol 500 mg PO QID PRN 19 


Multiple Vitamins W/ Minerals [Womens One Daily] 1 tab PO DAILY 19 


Topiramate [Topamax] 200 mg PO BID 19 











Decision To Admit





- Decistion To Admit


Decision to Admit Reason: Admit from ER


Decision to Admit Date: 19


Decision to Admit Time: 12:13 - CASE DISCUSSED WITH ROSITA SAN NP WHO AGREES TO

 ADMIT

## 2019-08-06 NOTE — HP
SUPERVISING PHYSICIAN:  Joaquin Gomez M.D.



CHIEF COMPLAINT:  Chest tightness.



HISTORY OF PRESENT ILLNESS:  This is a 43 year-old female patient who has a 2 
day history of tightness in her chest that radiates to the mid back and to the 
left arm.  It started about 2 days ago.  It lasted for several hours and then 
dissipated, and started back up last night.  It is mid sternal.  It is not 
alleviated by anything other than rest.  There are no factors that make it 
worse.  She came to the Emergency Room due to shortness of breath with lower 
extremity swelling.  She was also clammy.  She had some nausea but no vomiting. 
She does use oxygen at home due to her asthma and obstructive sleep apnea.  In 
the E. R. her vital signs showed a temperature 97.9, heart rate 87, blood 
pressure 131/113, respiratory rate 20, O2 sat 99% on room air.  Laboratory was 
done.  CBC was within normal limits.  PT was less than 8.9, INR was less than 1,
PTT is 26.4.  D-dimer was elevated at 0.60.  Sodium 139, potassium 3.8, chloride
113, carbon dioxide 18, BUN 19, creatinine 0.93.  Initial set of cardiac enzymes
was negative and 2 hours later her troponin was less than 0.02.  Chest x-ray was
obtained that showed no acute process identified in the chest.  A CTA of the 
chest was done that shows no pulmonary embolus or other acute intrathoracic 
abnormality.  A slightly dilated gallbladder without pericholecystic 
inflammation.  Calcified gallstone.  If clinically suspicious of cholelithiasis,
right upper quadrant ultrasound is suggested.  I was called for hospital 
admission.



PAST MEDICAL HISTORY: 

1.   Anxiety.

2.   Hypertension.

3.   Asthma.

4.   Seizure disorder.  Her last seizure was 2-1/2 months ago.  She is on 
Gabapentin

      for that.

5.   Migraine headaches.

6.   Herniated disc in her back.

7.   Obstructive sleep apnea.



PAST SURGICAL HISTORY:

1.   Three back and neck surgeries.

2.   Hysterectomy.

3.    sections.



OUTPATIENT MEDICATIONS:

1.   Tessalon Perles.

2.   Relpax.

3.   Methocarbamol.

4.   Multivitamins.

5.   Budesonide/Formoterol.

6.   Clonazepam.

7.   Toviaz.

8.   Gabapentin.

9.   Hydrocodone.

10. Hydroxyzine.

11. Losartan/Hydrochlorothiazide.

12. Topiramate.



ALLERGIES:  NO KNOWN DRUG ALLERGIES.



SOCIAL HISTORY:  She lives in [place].  She is disabled.  She has no history of 
tobacco, ETOH or illicit drug use.



REVIEW OF SYSTEMS: 

GENERAL:  Denies fever, fatigue or weight changes.

HEENT:  Negative for vision changes, ear pain, sinus symptoms or sore throat.

RESPIRATORY:  Positive for shortness of breath.  Negative for coughing or 
wheezing.

CARDIAC:  Positive for chest pain.  Negative for palpitations or tachycardia.

GASTROINTESTINAL:  Positive for nausea.  Negative for vomiting, diarrhea or 
constipation.

GENITOURINARY:  Negative for hematuria, dysuria or polyuria.

MUSCULOSKELETAL:  Negative for myalgias or arthralgias.

SKIN:  Negative for lesions or rashes.

NEUROLOGIC:  Positive for seizures and migraines.  Last seizure was 2-1/2 months
ago.  Negative for weakness.



PHYSICAL EXAMINATION: 



VITAL SIGNS:  Temperature 97.3, heart rate 77, blood pressure 118/91, 
respiratory rate 20, O2 sat 98% on room air.



GENERAL:  This is a 43 year-old obese white female who is lying in her hospital 
bed.  She is in no acute distress.



HEENT:  Normocephalic and atraumatic.  Pupils are equal and reactive.  
Oropharynx is clear.



NECK:  Supple without mass.  There is no discernible jugular venous distention.



CARDIOVASCULAR:  Regular rate and rhythm.  Sinus rhythm on the cardiac monitor.



GASTROINTESTINAL:  Abdomen is soft, nondistended, non-tender.  Bowel sounds are 
positive.



EXTREMITIES:  No clubbing, cyanosis or edema.



NEUROLOGIC:  She is awake, alert and oriented times three.  Cranial nerves II-
XII are grossly intact.



LABORATORY:  Labs and films are as per the History of Present Illness.



ASSESSMENT: 

1.   Chest pain, rule out acute coronary syndrome.

2.   Abdominal pain with cholelithiasis with concerns for developing gallbladder

      problems that may be contributing to #1.

3.   Seizure disorder on Gabapentin.

4.   Hypertension on medications.

5.   Obstructive sleep apnea on O2 at home.  Needs a sleep study for possible

      CPAP.

6.   Chronic pain syndrome secondary to back pain on narcotics.

7.   Asthma on home O2.

8.   Anxiety and depression.



PLAN:  We will place the patient in observation.  I have initiated the chest 
pain guidelines and will watch her serial cardiac enzymes.  I will make her NPO 
after midnight and we will do a gallbladder sonogram tomorrow.  If needed we 
will consult Surgery in regards to her cholelithiasis.  I will also do an 
echocardiogram on her as this is her second admission for chest pain.  She also 
has a significant history with her stopping Absorbine 6.  I have ordered labs 
for in the morning.  She will need close followup with her primary care 
physician, Dr. Tyler Garcia, on discharge as well as a surgeon for her 
cholelithiasis.  Will continue to monitor closely and follow as needed.



#[id number]

MTDD

## 2019-08-06 NOTE — RAD
EXAM DESCRIPTION: Chest,1 View



CLINICAL HISTORY: 43 years Female, chest pain



COMPARISON: Previous chest x-ray May 29, 2017



TECHNIQUE: AP portable chest.



FINDINGS:



Heart size is prominent with normal pulmonary vascularity. Plate

and screws in the lower C-spine.



No consolidating infiltrate.



No pulmonary mass or worrisome nodule.



No pneumothorax or pleural effusion.



Bones are unremarkable.



IMPRESSION:



No acute process is identified in the chest.



Electronically signed by:  Cristóbal Umaña MD  8/6/2019 11:04

AM CDT Workstation: [id number]

## 2019-08-07 VITALS — OXYGEN SATURATION: 99 %

## 2019-08-07 VITALS — TEMPERATURE: 97.4 F | DIASTOLIC BLOOD PRESSURE: 83 MMHG | SYSTOLIC BLOOD PRESSURE: 126 MMHG

## 2019-08-07 RX ADMIN — METHOCARBAMOL PRN MG: 750 TABLET ORAL at 08:11

## 2019-08-07 RX ADMIN — TOPIRAMATE SCH MG: 25 TABLET, FILM COATED ORAL at 09:17

## 2019-08-07 RX ADMIN — HYDROCODONE BITARTRATE AND ACETAMINOPHEN PRN EA: 10; 325 TABLET ORAL at 05:22

## 2019-08-07 RX ADMIN — Medication SCH ML: at 09:19

## 2019-08-07 RX ADMIN — GABAPENTIN SCH MG: 300 CAPSULE ORAL at 09:17

## 2019-08-07 NOTE — US
EXAM DESCRIPTION: 

Gall Bladder: ULTRASOUND.



CLINICAL HISTORY: 

cholelithiasis



COMPARISON: 

CTA chest 8/6/2019.



TECHNIQUE: 

Transabdominal scanning: Gray-scale and Doppler modes.



FINDINGS: 

Gallbladder: normal size, shape, echogenicity; no intraluminal

stones or sludge. No fluid around the gallbladder. No wall

thickening. 1.5 mm. Non-tender with transducer pressure.

Common bile duct: caliber 5.1 mm within normal limits. 

Liver:  Increased echogenicity; contour liver capsule smooth

where seen. No fluid around the liver. Intrahepatic biliary ducts

normal caliber.  Doppler hepatopedal flow portal vein..  Long

axis right lobe 14.8 cm.

Pancreas: normal size and echogenicity.  Duct not seen. 

Aorta: Proximal diameter 2.1 cm. 

Right kidney: 8.1 cm long axis. Cortical thickness 11 mm. Normal

cortical echogenicity. No hydronephrosis, no echogenic stones, no

perirenal fluid..



IMPRESSION: 

1. Steatosis of the liver with normal ducts and normal flow in

the portal vein. No focal lesions. Smooth capsule with no

ascites.

2. Gallbladder duct and pancreas are negative.

3. Minimal thinning of the renal cortex but otherwise negative.

Normal caliber of the proximal aorta and IVC.



Electronically signed by:  Neil Ortega MD  8/7/2019 9:24 AM CDT

Workstation: [id number]

## 2019-08-26 NOTE — DS
SUPERVISING PHYSICIAN:   SUMAN Gomez MD



ADMISSION DIAGNOSES:

1.   Chest pain, rule out acute coronary syndrome.

2.   Abdominal pain with cholelithiasis and concerns for developing gallbladder 
issues

      contributing to #1.

3.   Seizure disorder on Gabapentin.

4.   Hypertension on medications.

5.   Obstructive sleep apnea on O2 at home.

6.   Chronic pain syndrome secondary to back pain on narcotics.

7.   Asthma on home O2.

8.   Anxiety and depression.



DISCHARGE DIAGNOSES:

1.   Liver steatosis with no complicating findings on CT.

2.   Chest pain with cardiac enzymes being negative x3 and EKGs showing 

       normal sinus rhythm with no ST or T-wave changes indicating acute 
coronary

       injury pattern or ischemia felt to be secondary to some mild gastritis 
with no 

       complicating signs.

3.   History of seizure disorder on Gabapentin with no signs of seizure activity
during 

       admission.

4.   Hypertension on medications and stable.

5.   Obstructive sleep apnea on O2 at home, needing followup for sleep study for
 

      possible CPAP.

6.   Chronic pain syndrome on chronic pain medication to include narcotics.

7.   Asthma on home O2 without any signs of exacerbation.

8.   Anxiety and depression.



HISTORY OF PRESENT ILLNESS:  This is a 43 year-old female patient who has a 2 
day history of tightness in her chest that radiates to the mid back and to the 
left arm.  It started about 2 days ago.  It lasted for several hours and then 
dissipated, and started back up last night.  It is mid sternal.  It is not 
alleviated by anything other than rest.  There are no factors that make it 
worse.  She came to the Emergency Room due to shortness of breath with lower 
extremity swelling.  She was also clammy.  She had some nausea but no vomiting. 
She does use oxygen at home due to her asthma and obstructive sleep apnea.  In 
the E. R. her vital signs showed a temperature 97.9, heart rate 87, blood 
pressure 131/113, respiratory rate 20, O2 sat 99% on room air.  Laboratory was 
done.  CBC was within normal limits.  PT was less than 8.9, INR was less than 1,
PTT is 26.4.  D-dimer was elevated at 0.60.  Sodium 139, potassium 3.8, chloride
113, carbon dioxide 18, BUN 19, creatinine 0.93.  Initial set of cardiac enzymes
was negative and 2 hours later her troponin was less than 0.02.  Chest x-ray was
obtained that showed no acute process identified in the chest.  A CTA of the 
chest was done that shows no pulmonary embolus or other acute intrathoracic 
abnormality.  A slightly dilated gallbladder without pericholecystic 
inflammation.  Calcified gallstone.  If clinically suspicious of cholelithiasis,
right upper quadrant ultrasound is suggested.  I was called for hospital 
admission and the patient was admitted in stable condition. 



LABORATORY STUDIES:  CBC on admission showed a white count of 6,500, at 
discharge was 7,000 with no left shift.  Hemoglobin and hematocrit stable at 
12.2 and 32.5 respectively.  On discharge, studies showed just a slightly 
elevated D-dimer of 0.6.  PT/PTT were normal.  Chemistries on admission showed 
that she had a normal sodium and potassium.  Chloride was elevated at 113  after
fluids, at discharge sodium, potassium and chloride had normalized.  BUN was 
slightly elevated at 19, creatinine was 1.0 on discharge.  Liver functions were 
all within normal limits.  Troponins were less than 0.02 times 3.



RADIOLOGY:   Chest x-ray in the Emergency Room per radiology interpretation 
showed no acute process identified in the chest.  She also had a chest thoracic 
CTA due to the elevated D-dimer.  She had no pulmonary embolus or other acute 
intravascular abnormalities.  There was note of slightly dilated gallbladder 
without any peristaltic inflammation.  This was followed up with a gallbladder 
ultrasound and per radiology interpretation was note of steatosis of the liver, 
normal ducts, normal flow in the portal vein, no focal lesions, smooth capsule 
with no ascites.  There was note gallbladder duct and pancreas were negative. 
There was minimal thinning of the renal cortex.  Please see that full report for
details.



HOSPITAL COURSE:   Ms. Hinojosa was admitted as noted for rule out chest pain.  She
had no complicating factors.  EKG had no changes.  Troponins were all within 
normal limits.  She was found to be clinically stable.  Hemodynamically, vital 
signs showed she was normotensive on discharge.  Her temperature was 97.3, heart
rate 77, blood pressure 118/91 with respirations 14, oxygen saturation 90% on 
room air.



PHYSICAL EXAMINATION:

GENERAL:  The patient was resting comfortably and appeared to be in no acute 
distress.

CHEST:  Lungs clear to auscultation.

HEART:  Regular rate and rhythm.

ABDOMEN:  Obese but soft, non-tender with positive bowel sounds.

EXTREMITIES:   Without any edema.

NEUROLOGIC:  She is alert and oriented x3.



PLAN:  Ms. Hinojosa is discharged with instructions to followup with Irene Rosales 
on 08/20/2019 at 10 a.m. at [facility] as well as Cardiology 
which could be arranged through [facility].  At some point, she 
will need a stress test.  She is resume all medications as instructed and given 
instructions to return to the Emergency Room should she have any worsening or 
concerning symptoms.  



Discharge diet:  Low fat.

Activities:  Increase as tolerated.

Medications prescribed at discharge included Protonix 40 mg daily, #30.  All 
other medications prior to discharge were continued.  

Discharge condition stable and improved.  

Disposition:  The patient is discharged home.



#[id number]





[facility]

## 2019-08-28 ENCOUNTER — HOSPITAL ENCOUNTER (EMERGENCY)
Dept: HOSPITAL 39 - ER | Age: 44
Discharge: HOME | End: 2019-08-28
Payer: COMMERCIAL

## 2019-08-28 VITALS — OXYGEN SATURATION: 98 %

## 2019-08-28 VITALS — SYSTOLIC BLOOD PRESSURE: 127 MMHG | TEMPERATURE: 96.3 F | DIASTOLIC BLOOD PRESSURE: 89 MMHG

## 2019-08-28 DIAGNOSIS — I10: ICD-10-CM

## 2019-08-28 DIAGNOSIS — J44.9: ICD-10-CM

## 2019-08-28 DIAGNOSIS — R10.11: Primary | ICD-10-CM

## 2019-08-28 DIAGNOSIS — Z86.73: ICD-10-CM

## 2019-08-28 DIAGNOSIS — Z90.710: ICD-10-CM

## 2019-08-28 DIAGNOSIS — R11.0: ICD-10-CM

## 2019-08-28 DIAGNOSIS — G40.909: ICD-10-CM

## 2019-08-28 PROCEDURE — 83690 ASSAY OF LIPASE: CPT

## 2019-08-28 PROCEDURE — 36415 COLL VENOUS BLD VENIPUNCTURE: CPT

## 2019-08-28 PROCEDURE — 80053 COMPREHEN METABOLIC PANEL: CPT

## 2019-08-28 PROCEDURE — 85025 COMPLETE CBC W/AUTO DIFF WBC: CPT

## 2019-08-28 RX ADMIN — MORPHINE SULFATE ONE MG: 10 INJECTION INTRAVENOUS at 11:26

## 2019-08-28 RX ADMIN — ONDANSETRON ONE MG: 2 INJECTION INTRAMUSCULAR; INTRAVENOUS at 10:06

## 2019-08-28 RX ADMIN — MORPHINE SULFATE ONE MG: 10 INJECTION INTRAVENOUS at 10:09

## 2019-08-28 RX ADMIN — ONDANSETRON ONE MG: 2 INJECTION INTRAMUSCULAR; INTRAVENOUS at 11:24

## 2019-08-28 NOTE — ED.PDOC
History of Present Illness





- General


Chief Complaint: Abdominal Pain


Stated Complaint: RUQ abdominal pain


Time Seen by Provider: 19 09:57


Information Source: patient


Exam Limitations: no limitations





- History of Present Illness


Initial Comments: 





SHE HAS A HX OF GALLBLADDER DISEASE AND SEES DR. SMITH.  SHE IS SCHEDULED FOR 

SURGERY IN THE NEAR FUTURE.  SHE VOICES THAT SHE HAD FRENCH FRIES, A HAM AND 

CHEESES, MAY SANDWICH LAST NIGHT AND THEN AROUNF THREE OR FOUR O'CLOCK SHE 

STARTED WITH A BILIARY COLIC.  HAS BEEN NAUSEATED. THE PAIN ON THE RUQ RADIATES 

TO THE BACK AND RATES IT 8/10/  


Abdominal Pain Onset Location: RUQ


Pain Radiation: back


Quality: severe


Timing/Duration: 4-6 hours


Improving Factors: nothing


Worsening Factors: nothing


Associated Symptoms: nausea/vomiting





Review of Systems





- Review of Systems


Constitutional: States: no symptoms reported


EENTM: States: no symptoms reported


Respiratory: States: no symptoms reported


Cardiology: States: no symptoms reported


Gastrointestinal/Abdominal: States: abdominal pain, nausea


Musculoskeletal: States: no symptoms reported


Skin: States: no symptoms reported


Neurological: States: no symptoms reported


Endocrine: States: no symptoms reported


Hematologic/Lymphatic: States: no symptoms reported





Past Medical History (General)





- Patient Medical History


Hx Seizures: Yes - Epilepsy 12 yrs old


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


Hx Dementia: No


Hx Asthma: No


Hx of COPD: Yes


Hx Cardiac Disorders: No


Hx Congestive Heart Failure: No


Hx Pacemaker: No


Hx Hypertension: Yes


Hx Thyroid Disease: No


Hx Diabetes: No


Hx Gastroesophageal Reflux: No


Hx Renal Disease: No


Hx Cancer: No


Hx of HIV: No


Hx Hepatitis C: No


Hx MRSA: No


Surgical History: Hysterectomy





- Vaccination History


Hx Tetanus, Diphtheria Vaccination: Yes


Hx Influenza Vaccination: Yes


Hx Pneumococcal Vaccination: Yes





- Social History


Hx Tobacco Use: No


Hx Chewing Tobacco Use: No


Hx Alcohol Use: No


Hx Substance Use: No


Hx Substance Use Treatment: No


Hx Depression: No


Hx Physical Abuse: No


Hx Emotional Abuse: No


Hx Suspected Abuse: No





- Female History


Patient is a Female of Child Bearing Age (10 -59 yrs old): No


Patient Pregnant: No





Family Medical History





- Family History


  ** Mother


Family History: Unknown


Living Status: Unknown


Hx Family Hypertension: Yes - parents


Hx Family Stroke: Yes - brain aneurysm-dad,sister  from it


Hx Cardiac Disease: Yes - parents





Physical Exam





- Physical Exam


General Appearance: Alert, Ill Appearing, Well Developed, Well Groomed, Well 

Hydrated, Well Nourished


Eyes, Ears, Nose, Throat Exam: PERRL/EOMI, normal ENT inspection


Neck: non-tender, full range of motion, supple, normal inspection


Respiratory: chest non-tender, lungs clear, normal breath sounds, no respiratory

distress


Cardiovascular/Chest: normal peripheral pulses, regular rate, rhythm, no edema, 

no gallop, no JVD


Peripheral Pulses: No deficit


Gastrointestinal/Abdominal: normal bowel sounds, other - EXQUISITE TENDERNESS TO

THE RUQ, POSITIVE GUTIERREZ SIGN NOTED. 


Rectal Exam: deferred


Back Exam: normal inspection


Extremity: normal range of motion


Neurologic: no motor/sensory deficits, alert, normal mood/affect, oriented x 3





Progress





- Progress


Progress: 





19 11:09


THE PAIN SEEMS IMPROVED.  SHE HAS AN APPOINTMENT FOR A HIDA SCAN IN THE AM. LAB 

IS NORMAL.  I HAVE REVIEWED THE GB SONO AND NO STONES ARE NOTED, THE GB WALL WAS

NOT THICKENED THE MAIN DUCT WAS WNL.  WILL DC HOME.  I WILL GIVE FEED BACK TO 

DR. SMITH (IN SURGERY WILL CALL WHEN FINISHED). 





- Results/Orders


Results/Orders: 





                               Laboratory Results











WBC  6.0 K/mm3 (4.8-10.8)   19  10:00    


 


RBC  4.13 M/mm3 (4.20-5.40)  L  19  10:00    


 


Hgb  12.7 gm/dL (12.0-16.0)   19  10:00    


 


Hct  37.6 % (36.0-47.0)   19  10:00    


 


MCV  91.0 fl (81.0-99.0)   19  10:00    


 


MCH  30.7 pg (27.0-31.0)   19  10:00    


 


MCHC  33.7 g/dL (33.0-37.0)   19  10:00    


 


RDW  13.2 % (11.5-14.5)   19  10:00    


 


Plt Count  211 K/mm3 (130-400)   19  10:00    


 


MPV  8.5 fl (7.40-10.4)   19  10:00    


 


Absolute Neuts (auto)  3.90 K/uL (1.8-6.8)   19  10:00    


 


Absolute Lymphs (auto)  1.40 K/uL (1.0-3.4)   19  10:00    


 


Absolute Monos (auto)  0.50 K/uL (0.2-0.8)   19  10:00    


 


Absolute Eos (auto)  0.20 K/uL (0.0-0.4)   19  10:00    


 


Absolute Basos (auto)  0.00 K/uL (0.0-0.1)   19  10:00    


 


Neutrophils %  65.6 % (42.0-78.0)   19  10:00    


 


Lymphocytes %  23.1 % (20.0-50.0)   19  10:00    


 


Monocytes %  7.7 % (2.0-9.0)   19  10:00    


 


Eosinophils %  3.2 % (1.0-5.0)   19  10:00    


 


Basophils %  0.4 % (0.0-2.0)   19  10:00    


 


Sodium  141 mmol/L (135-145)   19  10:00    


 


Potassium  3.6 mmol/L (3.6-5.0)   19  10:00    


 


Chloride  114 mmol/L (101-111)  H  19  10:00    


 


Carbon Dioxide  18 mmol/L (21-31)  L  19  10:00    


 


Anion Gap  12.6  (12-18)   19  10:00    


 


BUN  14 mg/dL (7-18)   19  10:00    


 


Creatinine  0.94 mg/dL (0.6-1.3)   19  10:00    


 


BUN/Creatinine Ratio  14.9  (10-20)   19  10:00    


 


Random Glucose  100 mg/dL ()   19  10:00    


 


Serum Osmolality  281.8 mOsm/L (275-295)   19  10:00    


 


Calcium  10.2 mg/dL (8.4-10.2)   19  10:00    


 


Total Bilirubin  0.4 mg/dL (0.2-1.0)   19  10:00    


 


AST  21 IU/L (10-42)   19  10:00    


 


ALT  19 IU/L (10-60)   19  10:00    


 


Alkaline Phosphatase  70 IU/L ()   19  10:00    


 


Serum Total Protein  6.7 gm/dL (6.4-8.2)   19  10:00    


 


Albumin  4.1 g/dl (3.2-5.5)   19  10:00    


 


Globulin  2.6 gm/dL (2.3-3.5)   19  10:00    


 


Albumin/Globulin Ratio  1.6  (1.1-1.9)   19  10:00    


 


Lipase  25 U/L (22-51)   19  10:00    














Departure





- Departure


Clinical Impression: 


Abdominal pain


Qualifiers:


 Abdominal location: right upper quadrant Qualified Code(s): R10.11 - Right 

upper quadrant pain





Time of Disposition: 11:14


Disposition: Discharge to Home or Self Care


Departure Forms:  ED Discharge - Pt. Copy, Patient Portal Self Enrollment


Instructions:  DI for Abdominal Pain-Adult


Diet: other - NO GREASY MEALS


Activity: increase activity as tolerated


Referrals: 


NEO BERGMAN [Primary Care Provider] - 1-2 Weeks


Home Medications: 


Ambulatory Orders





Fesoterodine Fumarate [Toviaz] 8 mg PO DAILY #0 10/01/13 


Eletriptan Hydrobromide [Relpax] 40 mg PO DAILY PRN 03/08/15 


Gabapentin 600 mg PO TID 07/04/15 


HYDROcodone 10MG/APAP 325MG 1 each PO TID PRN 07/04/15 


Benzonatate 200 mg PO Q12H 19 


Budesonide-Formoterol Fumarate [Symbicort 160-4.5 Mcg/Act] 1 aer IN DAILY 

19 


Clonazepam 0.5 mg PO Q6H PRN 19 


Hydroxyzine HCl 12.5 mg PO NOON 19 


Hydroxyzine HCl 25 mg PO BEDTIME 19 


Losartan Potassium & Hydrochlo [Losartan Potassium/Hydroc 100-25 mg] 1 tab PO 

DAILY 19 


Methocarbamol 500 mg PO QID PRN 19 


Multiple Vitamins W/ Minerals [Womens One Daily] 1 tab PO DAILY 19 


Topiramate [Topamax] 200 mg PO BID 19 


Pantoprazole Tablet [Protonix] 40 mg PO DAILY@0630 #30 tab 19 








Additional Instructions: 


KEEP APPOINTMENT FOR THE HIDA SCAN.

## 2019-08-29 ENCOUNTER — HOSPITAL ENCOUNTER (OUTPATIENT)
Dept: HOSPITAL 39 - NM | Age: 44
End: 2019-08-29
Attending: SPECIALIST
Payer: COMMERCIAL

## 2019-08-29 DIAGNOSIS — R10.11: Primary | ICD-10-CM

## 2019-08-29 PROCEDURE — 78227 HEPATOBIL SYST IMAGE W/DRUG: CPT

## 2019-08-29 NOTE — NM
EXAM DESCRIPTION: 

Hepatobiliary w/CCK: Nuclear Medicine.



CLINICAL HISTORY: 

R10.11. Right upper quadrant pain.



COMPARISON: 

Gallbladder ultrasound 8/7/2019. 



TECHNIQUE: 

Patient was given 8.1  mCi of technetium 99 M mebrofenin

(Choletec) radiopharmaceutical IV. Anterior gamma camera images

were obtained of the right upper quadrant at 5 minute  intervals

for one hour . The patient was then given 2.4 mcg  CCK IV

infusion over 30-minute interval.  Gallbladder ejection fraction

was evaluated by measuring diminishing radioactivity in the

gallbladder, over 30 min interval. 



FINDINGS: After radiopharmaceutical administration, immediate

visualization of the liver parenchyma with no focal regions of

increased activity or photopenia. Timely visualization of the

intrahepatic ducts, gallbladder, common bile duct, and small

intestine.

After CCK administration began, patient had some nausea.

Intermediate decrease in activity after CCK administration with

90% of the activity decreased by 10 minutes and 91% activity

decreased by 30 minutes. 



IMPRESSION: 

1. No intrahepatic or extrahepatic biliary obstruction. Timely

visualization of the gallbladder and small bowel.

2. Normal gallbladder ejection fraction. Minimal nausea during

CCK IV administration. 



Electronically signed by:  Neil Ortega MD  8/29/2019 3:43 PM CDT

Workstation: [id number]

## 2020-01-27 ENCOUNTER — HOSPITAL ENCOUNTER (OUTPATIENT)
Dept: HOSPITAL 39 - LAB.O | Age: 45
End: 2020-01-27
Attending: FAMILY MEDICINE
Payer: COMMERCIAL

## 2020-01-27 DIAGNOSIS — N83.9: ICD-10-CM

## 2020-01-27 DIAGNOSIS — K59.00: Primary | ICD-10-CM

## 2020-01-27 NOTE — CT
Study: CT abdomen and pelvis. 



Indication: EPIASTRIC PAIN



Technique: Venous  phase CT imaging of the abdomen and pelvis

obtained after intravenous administration of contrast.  This exam

was performed according to our departmental dose-optimization

program, which includes automated exposure control, adjustment of

the mA and/or kV according to patient size and/or use of

iterative reconstruction technique.



Comparison: None.



Findings:



Lower chest, liver, gallbladder, pancreas, spleen, adrenal

glands, left kidney unremarkable. Mild atrophy right kidney.

Bladder incompletely distended. 



Mild fluid within the uterine cavity. The uterus appears small

and may be partially absent. Indeterminate low-density cystic

lesions bilateral kidneys with that on the left measuring up to

21 mm.



Mild constipation. Stomach, small bowel unremarkable. Appendix

not definitively visualized. No free fluid. No free air. No

pathologically enlarged adenopathy. Atherosclerosis aorta.

Degenerative changes of the spine noted.  



Impression:



Mild constipation



Indeterminate tiny low-density lesions bilateral ovaries.

Correlation with pelvic sonogram recommended.



Additional findings as above.



Electronically signed by:  Josse Robles MD  1/27/2020 1:56 PM CST

Workstation: [id number]

## 2020-10-01 ENCOUNTER — HOSPITAL ENCOUNTER (OUTPATIENT)
Dept: HOSPITAL 39 - US | Age: 45
End: 2020-10-01
Attending: FAMILY MEDICINE
Payer: COMMERCIAL

## 2020-10-01 DIAGNOSIS — R60.9: Primary | ICD-10-CM

## 2020-10-02 NOTE — US
Procedure:  US LOWER EXTREMITY VEINS LIMITED/UNILATERAL/FOLLOW

UP, US LOWER EXTREMITY VEINS LIMITED/UNILATERAL/FOLLOW UP        



Exam Date:  10/1/2020



Ordering Provider:  NEO BERGMAN



Clinical Indication:  PERIPHERAL EDEMA



Comparison: None



Real time ultrasound was utilized for evaluation of the deep

veins of the bilateral lower extremity. Color Doppler and pulse

Doppler analysis was performed, including B-mode/grayscale

imaging, Doppler spectral analysis and color flow analysis. Real

time visualization of the bilateral lower extremity deep veins

was accomplished. Representative images recorded.



The deep veins demonstrated no abnormal intraluminal signal. The

pulse Doppler and color Doppler flow patterns demonstrated normal

venous flow with respiratory variation. There was increased flow

with distal augmentation maneuvers. 





IMPRESSION:

No evidence of DVT in the bilateral lower extremity.



Electronically signed by:  Isai Bae MD  10/2/2020 8:16 AM CDT

Workstation: [id number]

## 2020-10-02 NOTE — US
Procedure:  US LOWER EXTREMITY VEINS LIMITED/UNILATERAL/FOLLOW

UP, US LOWER EXTREMITY VEINS LIMITED/UNILATERAL/FOLLOW UP        



Exam Date:  10/1/2020



Ordering Provider:  NEO BERGMAN



Clinical Indication:  PERIPHERAL EDEMA



Comparison: None



Real time ultrasound was utilized for evaluation of the deep

veins of the bilateral lower extremity. Color Doppler and pulse

Doppler analysis was performed, including B-mode/grayscale

imaging, Doppler spectral analysis and color flow analysis. Real

time visualization of the bilateral lower extremity deep veins

was accomplished. Representative images recorded.



The deep veins demonstrated no abnormal intraluminal signal. The

pulse Doppler and color Doppler flow patterns demonstrated normal

venous flow with respiratory variation. There was increased flow

with distal augmentation maneuvers. 





IMPRESSION:

No evidence of DVT in the bilateral lower extremity.



Electronically signed by:  Isai Bae MD  10/2/2020 8:16 AM CDT

Workstation: [id number]